# Patient Record
(demographics unavailable — no encounter records)

---

## 2024-11-18 NOTE — ASSESSMENT
[FreeTextEntry1] : 1. Thyroid Cancer metastatic to cervical lymph nodes 2. Postsurgical Hypothyroidism Surgery: Total thyroidectomy 6/20/23 Multifocal PTC in right lobe measuring 2.4cm and 1.3cm. Additional microcarcinomas in the the right, left and pyramidal lobes. Classical morphology and follicular morphology. Margins negative for carcinoma. Focal angioinvasion. No tumor necrosis. Microscopic ETE to strap muscles only. 26/49 LN positive, largest being 2.6cm, levels 2, 3, 4, 6 (bilateral LN involved), +MAIDA. 2015 FANTASMA Risk: intermediate, but with extensive LN spread AJCC 8th edition TNM Stage: T2N1b HANNAH: 187.1 mCi on 10/6/2023 Response: Indeterminate biochemical (positive TgAb) without structural evidence of disease.  Surveillance: 7/18/23 (outside): TSH 1.6, TG antibodies 105, Tg 8 7/26/2023: Tg 4.15, TgAB 44.9, Tg by LCMS 2.6, TSH 0.35 10/2023: HANNAH therapy 187.1 mCi on 10/6/2023. Unstimulated TG 1.66, TG AB 35.9, Stimulated .5, TG AB negative. Posttherapy scan: Bilateral thyroid bed remnant, central and right lateral neck iodine avid disease. New foci of iodine uptake in bilateral lung bases, which may be metastatic or inflammatory in nature. 11/15/2023: Tg 0.73, TgAb 24.3, Tg by LC MS 0.5, TSH 0.07 12/9/2023: Neck US with DORON 6/8/2024: Chest CT with normal lungs. Tg undetectable since 1/2024, most recently in 6/2024: TG undetectable, negative TgAb. 11/7/2024: Neck US with DORON.  PLAN: - Check TFTs, Tg panel every 6 to 12 months - Levothyroxine supplementation - continue Synthroid 150mcg daily x 6.5 tablets per week, adjust based on TFTs. -Can relax TSH goal to <2 if he continues to have excellent response -Can recheck thyroid ultrasound in 12 to 18 months  RTC in 6 months.  Reinier Story MD [facility] at [address], [address] Ph: [phone] Fax: [phone]

## 2024-11-18 NOTE — HISTORY OF PRESENT ILLNESS
[FreeTextEntry1] : CHIEF COMPLAINT: Thyroid Cancer  Surgeon: Dr. Vincent Jimenez  HISTORY OF PRESENTING ILLNESS: The patient is a 31year old M being seen in the office today for follow-up of thyroid cancer, postsurgical hypothyroidism.   Initially discovered nodule in early 2023 by PCP. Patient had follow up FNA of a 1.7cm right midpole nodule. FNA on 6/7/23 see scanned cytopath report, RMP 1.7 cm : [place] 6, positive for malignancy, PTC Right level 3 LN: positive for malignant cells, PTC. Molecular: N/A  Surgery: Total thyroidectomy 6/20/23 Multifocal PTC in right lobe measuring 2.4cm and 1.3cm. Additional microcarcinomas in the the right, left and pyramidal lobes. Classical morphology and follicular morphology. Margins negative for carcinoma. Focal angioinvasion. No tumor necrosis. Microscopic ETE to strap muscles only. 26/49 LN positive, largest being 2.6cm, levels 2, 3, 4, 6 (bilateral LN involved), +MAIDA. 2015 FANTASMA Risk: intermediate, but with extensive LN spread AJCC 8th edition TNM Stage: T2N1b HANNAH: 187.1 mCi on 10/6/2023  Surveillance:  7/18/23 (outside): TSH 1.6, TG antibodies 105, Tg 8 7/26/2023: Tg 4.15, TgAB 44.9, Tg by LCMS 2.6, TSH 0.35 10/2023: HANNAH therapy 187.1 mCi on 10/6/2023. Unstimulated TG 1.66, TG AB 35.9, Stimulated .5, TG AB negative.  Posttherapy scan: Bilateral thyroid bed remnant, central and right lateral neck iodine avid disease. New foci of iodine uptake in bilateral lung bases, which may be metastatic or inflammatory in nature. 11/15/2023: Tg 0.73, TgAb 24.3, Tg by LC MS 0.5, TSH 0.07 12/9/2023: Neck US with DORON 6/8/2024: Chest CT with normal lungs. Tg undetectable since 1/2024, most recently in 6/2024: TG undetectable, negative TgAb. 11/7/2024:  Neck US with DORON.  Postsurgical Hypothyroidism:  He is currently taking 150 mcg daily of Synthroid x 6.5 tablets/week. Reports compliance with medication.  He is taking it appropriately, on an empty stomach at least 30 minutes before food.  Overall feeling well.  Voice is normal now.  No tremors/palpitations.  Neck tightness has improved over time.  No constipation or diarrhea, no heat or cold intolerance, no recent skin or hair changes.  Patient's mother has a history of thyroid cancer.  No personal history of radiation exposure to the head and neck area.

## 2024-11-18 NOTE — REVIEW OF SYSTEMS
[Dysphonia] : dysphonia [All other systems negative] : All other systems negative [Palpitations] : no palpitations [Shortness Of Breath] : no shortness of breath [Tremors] : no tremors

## 2024-11-18 NOTE — PHYSICAL EXAM
[TextEntry] : PHYSICAL EXAMINATION: Vital signs from today's encounter reviewed.  GENERAL: No acute distress, clinically eukinetic, normal appearance HEAD: Normocephalic, atraumatic EYES: conjunctivae are pink and moist, no icterus, no proptosis  NECK: Well-healing surgical incision, no adenopathy CARDIOVASCULAR: well-perfused extremities, no peripheral edema RESPIRATORY: normal chest expansion with good pulmonary effort, no acute respiratory distress MUSCULOSKELETAL: no swelling, normal range of motion, normal gait SKIN: no pallor, no icterus, no rash  NEUROLOGIC: alert and oriented, no evident focal deficits, no tremors  PSYCHIATRIC: mood and affect are normal

## 2024-11-19 NOTE — CONSULT LETTER
[FreeTextEntry2] : Dr Ori Elise [FreeTextEntry3] : \par  Vincent Jimenez MD, FACS\par  \par  Otolaryngology-Head and Neck Surgery\par  [employer] at [facility]\par

## 2024-11-19 NOTE — CONSULT LETTER
[FreeTextEntry2] : Dr Ori Elise [FreeTextEntry3] : \par  Vincent Jimenez MD, FACS\par  \par  Otolaryngology-Head and Neck Surgery\par  [employer] at [facility]\par

## 2024-11-19 NOTE — HISTORY OF PRESENT ILLNESS
[de-identified] : 31 yro pt was referred by Dr. Elise for eval of thyroid cancer.  s/p Total thyroidectomy with central neck  dissection.  Bilateral lateral neck dissection levels 2, 3, and 4.  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  s/p Completed HANNAH in Sept 2023. Last US done 11/7/2024: The thyroidectomy bed is unremarkable. There is no cervical adenopathy. labs 11/18/2024: Free T4 was 1.7, TSH was 0.19L, thyroglobulin <0.10. Pt waiting to hear back from endo abt results.  Taking Synthroid 150mcg for6 days/week and 75mcg one day/ week Followed by endo Dr. Story.  Today pt denies neck swelling or pain,  dysphagia, dyspnea, dysphonia. No fever, chills, weight loss. Eating and drinking without issues.

## 2025-05-23 NOTE — ASSESSMENT
[FreeTextEntry1] : 1. Thyroid Cancer metastatic to cervical lymph nodes 2. Postsurgical Hypothyroidism Surgery: Total thyroidectomy 6/20/23 Multifocal PTC in right lobe measuring 2.4cm and 1.3cm. Additional microcarcinomas in the the right, left and pyramidal lobes. Classical morphology and follicular morphology. Margins negative for carcinoma. Focal angioinvasion. No tumor necrosis. Microscopic ETE to strap muscles only. 26/49 LN positive, largest being 2.6cm, levels 2, 3, 4, 6 (bilateral LN involved), +MAIDA. 2015 FANTASMA Risk: intermediate, but with extensive LN spread AJCC 8th edition TNM Stage: T2N1b HANNAH: 187.1 mCi on 10/6/2023  Surveillance: 10/2023: HANNAH therapy 187.1 mCi on 10/6/2023. Unstimulated TG 1.66, TG AB 35.9, Stimulated .5, TG AB negative. Posttherapy scan: Bilateral thyroid bed remnant, central and right lateral neck iodine avid disease. New foci of iodine uptake in bilateral lung bases, which may be metastatic or inflammatory in nature. 12/9/2023: Neck US with DORON 6/8/2024: Chest CT with normal lungs. Tg undetectable since 1/2024, most recently in 11/2024: TG undetectable, negative TgAb. 11/7/2024: Neck US with DORON.  PLAN: - Check TFTs, Tg panel.  If maintaining excellent response, can start checking TG annually. - Levothyroxine supplementation - continue Synthroid 137 mcg daily, adjust based on TFTs. - TSH goal 0.5-2 given excellent response after surgery and HANNAH - Can recheck neck US in 1 year from prior, around 11/2025.  RTC in 6 months.  If remains with excellent response, future visits can be annual.  Reinier Story MD [facility] at [address], [address] Ph: [phone] Fax: [phone]

## 2025-05-23 NOTE — REVIEW OF SYSTEMS
[All other systems negative] : All other systems negative [Dysphonia] : no dysphonia [Palpitations] : no palpitations [Shortness Of Breath] : no shortness of breath [Tremors] : no tremors

## 2025-05-23 NOTE — PHYSICAL EXAM
[TextEntry] : PHYSICAL EXAMINATION: Vital signs from today's encounter reviewed.  GENERAL: No acute distress, clinically eukinetic, normal appearance HEAD: Normocephalic, atraumatic EYES: conjunctivae are pink and moist, no icterus, no proptosis  NECK: Well-healed surgical incision, no adenopathy CARDIOVASCULAR: well-perfused extremities, no peripheral edema RESPIRATORY: normal chest expansion with good pulmonary effort, no acute respiratory distress MUSCULOSKELETAL: no swelling, normal range of motion, normal gait SKIN: no pallor, no icterus, no rash  NEUROLOGIC: alert and oriented, no evident focal deficits, no tremors  PSYCHIATRIC: mood and affect are normal

## 2025-05-23 NOTE — HISTORY OF PRESENT ILLNESS
[FreeTextEntry1] : CHIEF COMPLAINT: Thyroid Cancer  Surgeon: Dr. Vincent Jimenez  HISTORY OF PRESENTING ILLNESS: The patient is a 32 year old M being seen in the office today for follow-up of thyroid cancer, postsurgical hypothyroidism.   Initially discovered nodule in early 2023 by PCP. Patient had follow up FNA of a 1.7cm right midpole nodule. FNA on 6/7/23 see scanned cytopath report, RMP 1.7 cm : [place] 6, positive for malignancy, PTC Right level 3 LN: positive for malignant cells, PTC.   Surgery: Total thyroidectomy 6/20/23 Multifocal PTC in right lobe measuring 2.4cm and 1.3cm. Additional microcarcinomas in the the right, left and pyramidal lobes. Classical morphology and follicular morphology. Margins negative for carcinoma. Focal angioinvasion. No tumor necrosis. Microscopic ETE to strap muscles only. 26/49 LN positive, largest being 2.6cm, levels 2, 3, 4, 6 (bilateral LN involved), +MAIDA. 2015 FANTASMA Risk: intermediate, but with extensive LN spread AJCC 8th edition TNM Stage: T2N1b HANNAH: 187.1 mCi on 10/6/2023  Surveillance:  10/2023: HANNAH therapy 187.1 mCi on 10/6/2023. Unstimulated TG 1.66, TG AB 35.9, Stimulated .5, TG AB negative.  Posttherapy scan: Bilateral thyroid bed remnant, central and right lateral neck iodine avid disease. New foci of iodine uptake in bilateral lung bases, which may be metastatic or inflammatory in nature. 12/9/2023: Neck US with DORNO 6/8/2024: Chest CT with normal lungs. Tg undetectable since 1/2024, most recently in 11/2024: TG undetectable, negative TgAb. 11/7/2024:  Neck US with DORON.  Postsurgical Hypothyroidism:  He is currently taking Synthroid 137 mcg daily. Reports compliance with medication.  He is taking it appropriately, on an empty stomach at least 30 minutes before food.  Symptoms 5/22/2025: Reports feeling well, energy levels are good.  Reports he has always been constipated.  No heat or cold intolerance.  Endorses mild weight gain.  No palpitations or tremors, no lower extremity swelling.  Denies dysphagia or dyspnea or change in voice.  Neck incision is well-healed.  Patient's mother has a history of thyroid cancer.  No personal history of radiation exposure to the head and neck area.